# Patient Record
Sex: FEMALE
[De-identification: names, ages, dates, MRNs, and addresses within clinical notes are randomized per-mention and may not be internally consistent; named-entity substitution may affect disease eponyms.]

---

## 2019-10-25 ENCOUNTER — APPOINTMENT (OUTPATIENT)
Dept: ENDOCRINOLOGY | Facility: CLINIC | Age: 54
End: 2019-10-25
Payer: MEDICAID

## 2019-10-25 VITALS
SYSTOLIC BLOOD PRESSURE: 138 MMHG | HEART RATE: 72 BPM | HEIGHT: 65 IN | DIASTOLIC BLOOD PRESSURE: 90 MMHG | WEIGHT: 168 LBS | BODY MASS INDEX: 27.99 KG/M2

## 2019-10-25 DIAGNOSIS — I10 ESSENTIAL (PRIMARY) HYPERTENSION: ICD-10-CM

## 2019-10-25 DIAGNOSIS — Z87.891 PERSONAL HISTORY OF NICOTINE DEPENDENCE: ICD-10-CM

## 2019-10-25 DIAGNOSIS — Z83.49 FAMILY HISTORY OF OTHER ENDOCRINE, NUTRITIONAL AND METABOLIC DISEASES: ICD-10-CM

## 2019-10-25 PROBLEM — Z00.00 ENCOUNTER FOR PREVENTIVE HEALTH EXAMINATION: Status: ACTIVE | Noted: 2019-10-25

## 2019-10-25 PROCEDURE — 99205 OFFICE O/P NEW HI 60 MIN: CPT

## 2019-10-25 RX ORDER — MULTIVITAMIN
TABLET ORAL
Refills: 0 | Status: ACTIVE | COMMUNITY

## 2019-11-14 NOTE — ADDENDUM
[FreeTextEntry1] : Recent thyroid ultrasound as below; none of the nodules meet criteria for biopsy. I left a message for call back to discuss. 11/14/19\par \par Thyroid ultrasound (November 9, 2019) reviewed and significant for:\par FINDINGS:  \par ISTHMUS: Normal size in AP dimension measuring 0.2 cm.\par \par RIGHT LOBE:  Measures 3.8 x 1.1 x 1.2 cm in sagittal x AP x transverse dimensions. \par ARCHITECTURE: Mild to moderately heterogeneous. \par COLOR DOPPLER: Mildly hypervascular.\par Nodule 1\par Location: Upper pole\par Shape: Wider than tall\par Size: 1.1 x 0.6 x 0.8 cm\par Echogenicity: Mildly hypoechoic\par Color Doppler: Mildly vascular\par Calcification: None\par Margination: Variable\par \par LEFT LOBE:    Measures 4.2 x 1 x 1 cm in sagittal x AP x transverse dimensions.\par ARCHITECTURE: Slightly heterogeneous. \par COLOR DOPPLER:  Unremarkable.\par Nodule 1\par Location: Lower pole\par Shape: Wider than tall\par Size: 1 x 0.6 x 0.7 cm\par Echogenicity: Mildly hypoechoic\par Color Doppler: Moderately vascular\par Calcification: None\par Margination: Smooth\par \par Nodule 2\par Location: Mid lobe\par Shape: Wider than tall\par Size: 0.4 x 0.2 cm\par Echogenicity: Mixed isoechoic hypoechoic\par Color Doppler: Negative\par Calcification: None\par Margination: Variable\par \par IMPRESSION:  \par 1. Thyroid is of normal size. The right lobe is mildly heterogeneous and hypervascular-correlate with any clinical findings of thyroiditis.\par 2. There are multiple thyroid nodules detailed above. None demonstrate intrinsically suspicious shape, size, margination, echogenicity or calcification. Without prior studies for comparison however, I recommend follow-up ultrasound surveillance in one year to ensure stability.

## 2019-11-14 NOTE — DATA REVIEWED
[FreeTextEntry1] : Laboratories (September 28, 2019) reviewed and significant for: \par Unremarkable complete blood count\par Bilirubin 1.3 mg/dL (normal: <1.2), otherwise unremarkable comprehensive metabolic panel\par TSH 1.230 uIU/mL\par Hemoglobin A1c 5.9%\par \par Thyroid ultrasound (May 3, 2019) reviewed and significant for:\par Left inferior lobe 0.8 x 0.6 x 0.2 cm nodule with interior vascularity.\par Inferior isthmus 2.8 x 0.3 x 0.3 cm hypoechoic nodule without other concerning features.

## 2019-11-14 NOTE — HISTORY OF PRESENT ILLNESS
[FreeTextEntry1] : Ms. Gonzalez is a 54 year-old woman with a history of recently diagnosed hypertension presenting to establish care with me for thyroid nodules, elevated body mass index. \par \par Thyroid nodules.\par She had a thyroid ultrasound in May 2019 in her home country of [facility], significant for a left inferior lobe 0.8 x 0.6 x 0.2 cm nodule with interior vascularity and an inferior isthmus 2.8 x 0.3 x 0.3 cm hypoechoic nodule without other concerning features.\par She has been clinically and biochemically euthyroid. \par No history of radiation exposure.\par Two sisters with hypothyroidism, one with thyroid nodules. No family history of thyroid or other endocrine cancer.\par \par Elevated body mass index. Comorbidity of elevated HbA1c.\par Her young adulthood weight was 110 pounds. She gradually gained weight over time. She weighed 140 pounds last year, with 28 pound weight gain over the past year. She has been more sedentary; she used to go to [employer] classes close to home, however, the studio closed.\par She has not used any medications for weight loss. \par 24 hour diet recall: breakfast, slice of bread, cheese, tropical punch; lunch, white rice, beef, 2 beef/pork empanadas, slice of cake (baby shower); dinner, none; snack, yogurt, chocolate; no sodas\par \par She has fatigue, weight gain, headaches, dizziness, polyuria. No dysphagia, fixed/hard neck mass, shortness of breath. No palpitations, diarrhea/constipation, hair/skin changes, depression/anxiety. Her primary care provider recently prescribed a medication for hypertension but she has not yet started.

## 2019-11-14 NOTE — PHYSICAL EXAM
[No Acute Distress] : no acute distress [Alert] : alert [Normal Sclera/Conjunctiva] : normal sclera/conjunctiva [Normal Oropharynx] : the oropharynx was normal [Healthy Appearance] : healthy appearance [Normal Rate and Effort] : normal respiratory rhythm and effort [Clear to Auscultation] : lungs were clear to auscultation bilaterally [Normal Rate] : heart rate was normal  [Regular Rhythm] : with a regular rhythm [Normal S1, S2] : normal S1 and S2 [No Stigmata of Cushings Syndrome] : no stigmata of cushings syndrome [Normal Gait] : normal gait [Normal Insight/Judgement] : insight and judgment were intact [Supple] : the neck was supple [No LAD] : no lymphadenopathy [Kyphosis] : no kyphosis present [Acanthosis Nigricans] : no acanthosis nigricans [de-identified] : + approximately 1.5 cm nodule left of the midline; no other nodules appreciated [de-identified] : no moon facies, no supraclavicular fat pads

## 2019-11-14 NOTE — ASSESSMENT
[FreeTextEntry1] : Thyroid nodules. She is clinically and biochemically euthyroid. Recent thyroid ultrasound demonstrated an inferior isthmus 2.8 x 0.3 x 0.3 cm hypoechoic nodule that would meet criteria for biopsy. We will obtain an interval thyroid ultrasound here and discuss if biopsy is indicated. We discussed that approximately 95 percent of all thyroid nodules are caused by benign conditions. We discussed the procedure for fine needle aspiration of thyroid nodules and possible results, including further testing for atypia of undetermined significance. She will call after ultrasound to discuss results.\par \par Elevated body mass index. Comorbidity of elevated HbA1c. We discussed the importance of diet and exercise and lifestyle modification for glycemic control and weight loss. We discussed referral to nutrition. We can consider pharmacologic options for glycemic control and weight loss next visit.\par \par Return to see me in 2 months.\par \par CC:\par Dr. Judson Hoffman, Fax [phone]

## 2020-01-09 ENCOUNTER — APPOINTMENT (OUTPATIENT)
Dept: ENDOCRINOLOGY | Facility: CLINIC | Age: 55
End: 2020-01-09
Payer: MEDICAID

## 2020-01-09 VITALS
BODY MASS INDEX: 27.62 KG/M2 | SYSTOLIC BLOOD PRESSURE: 127 MMHG | WEIGHT: 166 LBS | DIASTOLIC BLOOD PRESSURE: 84 MMHG | HEART RATE: 89 BPM

## 2020-01-09 DIAGNOSIS — E04.2 NONTOXIC MULTINODULAR GOITER: ICD-10-CM

## 2020-01-09 DIAGNOSIS — R73.09 OTHER ABNORMAL GLUCOSE: ICD-10-CM

## 2020-01-09 PROCEDURE — 83036 HEMOGLOBIN GLYCOSYLATED A1C: CPT | Mod: QW

## 2020-01-09 PROCEDURE — 99214 OFFICE O/P EST MOD 30 MIN: CPT | Mod: 25

## 2020-01-09 PROCEDURE — 82962 GLUCOSE BLOOD TEST: CPT

## 2020-01-09 PROCEDURE — 97802 MEDICAL NUTRITION INDIV IN: CPT

## 2020-01-10 LAB
GLUCOSE BLDC GLUCOMTR-MCNC: 95
HBA1C MFR BLD HPLC: 5.7

## 2020-01-10 NOTE — ASSESSMENT
[FreeTextEntry1] : Elevated body mass index. Comorbidities of elevated HbA1c, nonalcoholic steatohepatitis. We discussed the importance of diet and exercise and lifestyle modification for glycemic control and weight loss. We discussed follow-up with nutrition. We discussed pharmacologic options for weight loss. She is amenable to bupropion. We discussed the risks and benefits of bupropion, including but not limited to nausea, headache, constipation, decrease in the seizure threshold. We will start bupropion  mg daily for one week, then 300 mg daily. \par \par Thyroid nodules. She is clinically and biochemically euthyroid. Prior thyroid ultrasound demonstrated an inferior isthmus 2.8 x 0.3 x 0.3 cm hypoechoic nodule that would meet criteria for biopsy. Thyroid ultrasound in November 2019 without nodules meeting criteria for biopsy. We discussed that approximately 95 percent of all thyroid nodules are caused by benign conditions. \par \par Return to see me and nutrition in 3 months.\par \par CC:\par Dr. Judson Hoffman, Fax [phone]

## 2020-01-10 NOTE — DATA REVIEWED
[FreeTextEntry1] : Laboratories (September 28, 2019) reviewed and significant for: \par Unremarkable complete blood count\par Bilirubin 1.3 mg/dL (normal: <1.2), otherwise unremarkable comprehensive metabolic panel\par TSH 1.230 uIU/mL\par Hemoglobin A1c 5.9%\par \par Thyroid ultrasound (November 9, 2019) reviewed and significant for:\par FINDINGS:  \par ISTHMUS: Normal size in AP dimension measuring 0.2 cm.\par \par RIGHT LOBE:  Measures 3.8 x 1.1 x 1.2 cm in sagittal x AP x transverse dimensions. \par ARCHITECTURE: Mild to moderately heterogeneous. \par COLOR DOPPLER: Mildly hypervascular.\par Nodule 1\par Location: Upper pole\par Shape: Wider than tall\par Size: 1.1 x 0.6 x 0.8 cm\par Echogenicity: Mildly hypoechoic\par Color Doppler: Mildly vascular\par Calcification: None\par Margination: Variable\par \par LEFT LOBE:    Measures 4.2 x 1 x 1 cm in sagittal x AP x transverse dimensions.\par ARCHITECTURE: Slightly heterogeneous. \par COLOR DOPPLER:  Unremarkable.\par Nodule 1\par Location: Lower pole\par Shape: Wider than tall\par Size: 1 x 0.6 x 0.7 cm\par Echogenicity: Mildly hypoechoic\par Color Doppler: Moderately vascular\par Calcification: None\par Margination: Smooth\par \par Nodule 2\par Location: Mid lobe\par Shape: Wider than tall\par Size: 0.4 x 0.2 cm\par Echogenicity: Mixed isoechoic hypoechoic\par Color Doppler: Negative\par Calcification: None\par Margination: Variable\par \par IMPRESSION:  \par 1. Thyroid is of normal size. The right lobe is mildly heterogeneous and hypervascular-correlate with any clinical findings of thyroiditis.\par 2. There are multiple thyroid nodules detailed above. None demonstrate intrinsically suspicious shape, size, margination, echogenicity or calcification. Without prior studies for comparison however, I recommend follow-up ultrasound surveillance in one year to ensure stability. \par \par Thyroid ultrasound (May 3, 2019) reviewed and significant for:\par Left inferior lobe 0.8 x 0.6 x 0.2 cm nodule with interior vascularity.\par Inferior isthmus 2.8 x 0.3 x 0.3 cm hypoechoic nodule without other concerning features.

## 2020-01-10 NOTE — PHYSICAL EXAM
[Alert] : alert [Healthy Appearance] : healthy appearance [No Acute Distress] : no acute distress [Normal Oropharynx] : the oropharynx was normal [Normal Sclera/Conjunctiva] : normal sclera/conjunctiva [No LAD] : no lymphadenopathy [Supple] : the neck was supple [Normal Rate and Effort] : normal respiratory rhythm and effort [Normal Rate] : heart rate was normal  [Clear to Auscultation] : lungs were clear to auscultation bilaterally [Normal S1, S2] : normal S1 and S2 [Regular Rhythm] : with a regular rhythm [No Stigmata of Cushings Syndrome] : no stigmata of cushings syndrome [Normal Gait] : normal gait [Normal Insight/Judgement] : insight and judgment were intact [Kyphosis] : no kyphosis present [de-identified] : + approximately 1.5 cm nodule left of the midline; no other nodules appreciated [Acanthosis Nigricans] : no acanthosis nigricans [de-identified] : no moon facies, no supraclavicular fat pads

## 2020-01-10 NOTE — HISTORY OF PRESENT ILLNESS
[FreeTextEntry1] : Ms. Gonzalez is a 54 year-old woman with a history of recently diagnosed hypertension presenting for follow-up of thyroid nodules, elevated body mass index. I saw her for an initial visit in October 2019.\par \par Thyroid nodules.\par She had a thyroid ultrasound in May 2019 in her home country of [facility], significant for a left inferior lobe 0.8 x 0.6 x 0.2 cm nodule with interior vascularity and an inferior isthmus 2.8 x 0.3 x 0.3 cm hypoechoic nodule without other concerning features.\par She has been clinically and biochemically euthyroid. \par No history of radiation exposure.\par Two sisters with hypothyroidism, one with thyroid nodules. No family history of thyroid or other endocrine cancer.\par \par Elevated body mass index. Comorbidities of elevated HbA1c, nonalcoholic steatohepatitis. \par Her young adulthood weight was 110 pounds. She gradually gained weight over time. She weighed 140 pounds last year, with 28 pound weight gain over the past year. She has been more sedentary; she used to go to [employer] classes close to home, however, the studio closed.\par She has not used any medications for weight loss. \par 24 hour diet recall: breakfast, slice of bread, cheese, tropical punch; lunch, white rice, beef, 2 beef/pork empanadas, slice of cake (baby shower); dinner, none; snack, yogurt, chocolate; no sodas\par \par Interim History \par Thyroid ultrasound from November 2019 as below; none of the nodules met criteria for biopsy. \par She saw a gastroenterologist. Recent abdominal ultrasound with fatty liver.\par She has fatigue, weight gain, headaches, dizziness, polyuria. No dysphagia, fixed/hard neck mass, shortness of breath. No palpitations, diarrhea/constipation, hair/skin changes, depression/anxiety. Her primary care provider recently prescribed a medication for hypertension but she has not started.\par Medical and surgical history, medications, allergies, social and family history reviewed and updated as needed.

## 2020-02-11 ENCOUNTER — APPOINTMENT (OUTPATIENT)
Dept: ENDOCRINOLOGY | Facility: CLINIC | Age: 55
End: 2020-02-11

## 2020-04-09 ENCOUNTER — APPOINTMENT (OUTPATIENT)
Dept: ENDOCRINOLOGY | Facility: CLINIC | Age: 55
End: 2020-04-09

## 2020-07-13 ENCOUNTER — RX RENEWAL (OUTPATIENT)
Age: 55
End: 2020-07-13

## 2020-10-05 ENCOUNTER — RX RENEWAL (OUTPATIENT)
Age: 55
End: 2020-10-05

## 2020-10-05 RX ORDER — BUPROPION HYDROCHLORIDE 300 MG/1
300 TABLET, EXTENDED RELEASE ORAL DAILY
Qty: 90 | Refills: 0 | Status: ACTIVE | COMMUNITY
Start: 2020-01-09 | End: 1900-01-01

## 2021-01-08 ENCOUNTER — RX RENEWAL (OUTPATIENT)
Age: 56
End: 2021-01-08

## 2021-05-07 ENCOUNTER — RX RENEWAL (OUTPATIENT)
Age: 56
End: 2021-05-07